# Patient Record
Sex: FEMALE | Race: WHITE | URBAN - METROPOLITAN AREA
[De-identification: names, ages, dates, MRNs, and addresses within clinical notes are randomized per-mention and may not be internally consistent; named-entity substitution may affect disease eponyms.]

---

## 2018-04-03 ENCOUNTER — HOSPITAL ENCOUNTER (EMERGENCY)
Facility: CLINIC | Age: 58
Discharge: HOME | End: 2018-04-03
Attending: EMERGENCY MEDICINE | Admitting: EMERGENCY MEDICINE
Payer: OTHER MISCELLANEOUS

## 2018-04-03 ENCOUNTER — APPOINTMENT (OUTPATIENT)
Dept: GENERAL RADIOLOGY | Facility: CLINIC | Age: 58
End: 2018-04-03
Attending: EMERGENCY MEDICINE
Payer: OTHER MISCELLANEOUS

## 2018-04-03 VITALS
BODY MASS INDEX: 22.62 KG/M2 | DIASTOLIC BLOOD PRESSURE: 78 MMHG | RESPIRATION RATE: 16 BRPM | TEMPERATURE: 98.5 F | HEART RATE: 72 BPM | WEIGHT: 158 LBS | SYSTOLIC BLOOD PRESSURE: 112 MMHG | OXYGEN SATURATION: 100 % | HEIGHT: 70 IN

## 2018-04-03 DIAGNOSIS — S42.402A CLOSED FRACTURE OF LEFT ELBOW, INITIAL ENCOUNTER: ICD-10-CM

## 2018-04-03 DIAGNOSIS — S53.105A DISLOCATION OF LEFT ELBOW, INITIAL ENCOUNTER: ICD-10-CM

## 2018-04-03 PROCEDURE — 96372 THER/PROPH/DIAG INJ SC/IM: CPT

## 2018-04-03 PROCEDURE — 99285 EMERGENCY DEPT VISIT HI MDM: CPT | Mod: 25

## 2018-04-03 PROCEDURE — 25000125 ZZHC RX 250: Performed by: EMERGENCY MEDICINE

## 2018-04-03 PROCEDURE — 24620 CLTX MONTEGGIA FX DISLC ELBW: CPT | Mod: LT

## 2018-04-03 PROCEDURE — 25000128 H RX IP 250 OP 636: Performed by: EMERGENCY MEDICINE

## 2018-04-03 PROCEDURE — 40000986 XR ELBOW LT 2 VW: Mod: LT

## 2018-04-03 PROCEDURE — 25000132 ZZH RX MED GY IP 250 OP 250 PS 637: Performed by: EMERGENCY MEDICINE

## 2018-04-03 PROCEDURE — 73060 X-RAY EXAM OF HUMERUS: CPT | Mod: LT

## 2018-04-03 PROCEDURE — 24600 TX CLSD ELBOW DISLC W/O ANES: CPT | Mod: LT

## 2018-04-03 PROCEDURE — 73080 X-RAY EXAM OF ELBOW: CPT | Mod: LT

## 2018-04-03 RX ORDER — HYDROMORPHONE HYDROCHLORIDE 1 MG/ML
0.5 INJECTION, SOLUTION INTRAMUSCULAR; INTRAVENOUS; SUBCUTANEOUS
Status: DISCONTINUED | OUTPATIENT
Start: 2018-04-03 | End: 2018-04-03 | Stop reason: HOSPADM

## 2018-04-03 RX ORDER — HYDROCODONE BITARTRATE AND ACETAMINOPHEN 5; 325 MG/1; MG/1
1-2 TABLET ORAL EVERY 6 HOURS PRN
Qty: 15 TABLET | Refills: 0 | Status: SHIPPED | OUTPATIENT
Start: 2018-04-03

## 2018-04-03 RX ORDER — IBUPROFEN 600 MG/1
600 TABLET, FILM COATED ORAL ONCE
Status: COMPLETED | OUTPATIENT
Start: 2018-04-03 | End: 2018-04-03

## 2018-04-03 RX ORDER — ONDANSETRON 4 MG/1
4 TABLET, ORALLY DISINTEGRATING ORAL ONCE
Status: COMPLETED | OUTPATIENT
Start: 2018-04-03 | End: 2018-04-03

## 2018-04-03 RX ORDER — HYDROCODONE BITARTRATE AND ACETAMINOPHEN 5; 325 MG/1; MG/1
1 TABLET ORAL ONCE
Status: COMPLETED | OUTPATIENT
Start: 2018-04-03 | End: 2018-04-03

## 2018-04-03 RX ORDER — ONDANSETRON 4 MG/1
4 TABLET, ORALLY DISINTEGRATING ORAL EVERY 8 HOURS PRN
Qty: 10 TABLET | Refills: 0 | Status: SHIPPED | OUTPATIENT
Start: 2018-04-03 | End: 2018-04-06

## 2018-04-03 RX ADMIN — ONDANSETRON 4 MG: 4 TABLET, ORALLY DISINTEGRATING ORAL at 11:44

## 2018-04-03 RX ADMIN — MIDAZOLAM HYDROCHLORIDE 5 MG: 1 INJECTION, SOLUTION INTRAMUSCULAR; INTRAVENOUS at 09:39

## 2018-04-03 RX ADMIN — IBUPROFEN 600 MG: 600 TABLET ORAL at 08:21

## 2018-04-03 RX ADMIN — HYDROMORPHONE HYDROCHLORIDE 0.5 MG: 1 INJECTION, SOLUTION INTRAMUSCULAR; INTRAVENOUS; SUBCUTANEOUS at 09:39

## 2018-04-03 RX ADMIN — HYDROCODONE BITARTRATE AND ACETAMINOPHEN 1 TABLET: 5; 325 TABLET ORAL at 08:21

## 2018-04-03 NOTE — ED AVS SNAPSHOT
Emergency Department    [address] [place] [id number]    Phone:  [phone]    Fax:  [phone]                                       Inga Anderson   MRN: [id number]    Department:   Emergency Department   Date of Visit:  4/3/2018           After Visit Summary Signature Page     I have received my discharge instructions, and my questions have been answered. I have discussed any challenges I see with this plan with the nurse or doctor.    ..........................................................................................................................................  Patient/Patient Representative Signature      ..........................................................................................................................................  Patient Representative Print Name and Relationship to Patient    ..................................................               ................................................  Date                                            Time    ..........................................................................................................................................  Reviewed by Signature/Title    ...................................................              ..............................................  Date                                                            Time

## 2018-04-03 NOTE — DISCHARGE INSTRUCTIONS
Elbow Dislocation  An elbow dislocation may occur after a fall onto an outstretched arm or in a car accident. When the hand hits a hard surface, the force is sent to the elbow. This tears ligaments and forces the bones out of the joint. Usually no bones are broken. But the nearby nerves and blood vessels can be damaged.     Once the joint is put back in place, it will take about 6 weeks for the ligaments to heal. For simple dislocations, you will use a splint or sling for the first few weeks. You will need range-of-motion exercises or physical therapy early in your recovery. This will prevent the elbow joint from getting stiff. Later, you may need strengthening exercises.  In more severe cases, you may need surgery to realign the joint and repair the torn ligaments or broken bones. Most elbow dislocations heal fully. But there is some risk for arthritis or loss of full range of motion in that joint.  Home care  Follow these guidelines when caring for yourself at home:    Keep your arm elevated to reduce pain and swelling. When sitting or lying down keep your arm above the level of your heart. You can do this by placing your arm on a pillow that rests on your chest or on a pillow at your side. This is most important during the first 2 days (48 hours) after the injury.    Put an ice pack on the injured area. Do this for 20 minutes every 1 to 2 hours the first day. You can make an ice pack by wrapping a plastic bag of ice cubes in a thin towel. As the ice melts, be careful that the cast or splint doesn t get wet. You can put the ice pack inside the sling and directly over the splint or cast. Continue using the ice pack 3 to 4 times a day for the next 2 days. Then use the ice pack as needed to ease pain and swelling.    Keep the splint or cast completely dry at all times. Bathe with your splint or cast out of the water. Protect it with a large plastic bag, rubber-banded at the top end. If a fiberglass splint or cast  gets wet, you can dry it with a hair dryer on the cool setting.    You may use acetaminophen or ibuprofen to control pain, unless another pain medicine was prescribed. If you have chronic liver or kidney disease, talk with your healthcare provider before using these medicines. Also talk with your provider if you ve had a stomach ulcer or gastrointestinal bleeding.    Don t take part in sports or physical education until your healthcare provider says it s OK to do so.  Follow-up care  Follow up with your healthcare provider in 1 week, or as advised. Ask your provider when to start range-of-motion exercises. These will keep the elbow from getting stiff.  If X-rays were taken, a radiologist may look at them. You will be told of any new findings that may affect your care.  When to seek medical advice  Call your healthcare provider right away if any of these occur:    The plaster splint or cast becomes wet or soft    The fiberglass splint or cast stays wet for more than 24 hours    Tightness or pain in the elbow gets worse    Fingers become swollen, cold, blue, numb, or tingly    You can t move your elbow as much as you could  Date Last Reviewed: 5/1/2017 2000-2017 [employer]. [address] [place] [address]. All rights reserved. This information is not intended as a substitute for professional medical care. Always follow your healthcare professional's instructions.          Elbow Fracture    You have a break (fracture) of one or more bones of your elbow joint. This may be a small crack in the bone. Or it may be a major break, with the broken parts pushed out of position.  This fracture usually takes 4 to 12 weeks to heal, depending on the type. The first step in treatment is with a splint or cast. Severe fractures may need surgery to put the bone fragments back into place.  Home care  Follow these guidelines when caring for yourself at home:    Keep your arm elevated to reduce pain and  swelling. When sitting or lying down keep your arm above the level of your heart. You can do this by placing your arm on a pillow that rests on your chest or on a pillow at your side. This is most important during the first 2 days (48 hours) after the injury.    Put an ice pack on the injured area. Do this for 20 minutes every 1 to 2 hours the first day. You can make an ice pack by wrapping a plastic bag of ice cubes in a thin towel. As the ice melts, be careful that the cast or splint doesn t get wet. You can place the ice pack inside the sling and directly over the splint or cast. Continue to use the ice pack 3 to 4 times a day for the next 2 days. Then use the ice pack as needed to ease pain and swelling.    Keep the splint or cast completely dry at all times. Bathe with your splint or cast out of the water. Protect it with a large plastic bag, rubber-banded at the top end. If a fiberglass splint or cast gets wet, you can dry it with a hair dryer.    You may use acetaminophen or ibuprofen to control pain, unless another pain medicine was prescribed. If you have chronic liver or kidney disease, talk with your healthcare provider before using these medicines. Also talk with your provider if you ve had a stomach ulcer or gastrointestinal bleeding.    Don t put creams or objects under the cast if you have itching.  Follow-up care  Follow up with your healthcare provider in 1 week, or as advised. This is to make sure the bone is healing the way it should. If a splint was put on, it may be changed to a cast during your follow-up visit.  X-rays may be taken. You will be told of any new findings that may affect your care.  When to seek medical advice  Call your healthcare provider right away if any of these occur:    The cast or splint cracks    The plaster cast or splint becomes wet or soft    The fiberglass cast or splint stays wet for more than 24 hours    Tightness or pain under the cast or splint gets worse    Bad  odor from the cast or wound fluid stains the cast    Fingers become swollen, cold, blue, numb, or tingly    You can t move your fingers    Skin around cast becomes red    Fever of 100.4 F (38 C) or higher, or as directed by your healthcare provider   Date Last Reviewed: 2/6/2017 2000-2017 [employer]. [address] [place] [address]. All rights reserved. This information is not intended as a substitute for professional medical care. Always follow your healthcare professional's instructions.

## 2018-04-03 NOTE — ED PROVIDER NOTES
"  History     Chief Complaint:  Arm Pain     The history is provided by the patient.      Inga Anderson is a 58 year old female who presents with arm pain. The patient flew in from [place], [place] for work in the [place] this morning and after she got the plane she slipped on a patch of ice. She fell and landed on her left arm and since then she has had pain in her left arm/elbow region. She has not other medical concerns.  No numbness or tingling distally, unable to range her elbow, constant and non radiating pain to elbow.     Allergies:  Codeine      Medications:    Aspirin    Past Medical History:    The patient does not have any past pertinent medical history.     Past Surgical History:    Appendectomy  Cholecystectomy  GYN Surgery    Family History:    History reviewed. No pertinent family history.      Social History:  Presents alone   Tobacco use: Never  Alcohol use: No  [facility]: [facility] [place]    Marital Status:        Review of Systems   Musculoskeletal:        Left arm pain   All other systems reviewed and are negative.    Physical Exam     Patient Vitals for the past 24 hrs:   BP Temp Temp src Pulse Resp SpO2 Height Weight   04/03/18 1015 117/77 - - - - 97 % - -   04/03/18 1000 109/76 - - - - 92 % - -   04/03/18 0945 120/78 - - - - - - -   04/03/18 0930 120/87 - - - - 100 % - -   04/03/18 0915 126/78 - - - - 100 % - -   04/03/18 0807 107/80 98.5  F (36.9  C) Oral 72 16 100 % 1.768 m (5' 9.6\") 71.7 kg (158 lb)      Physical Exam  Vitals: reviewed by me  General: Pt seen on [facility], cooperative, and alert to conversation  Eyes: Tracking well, clear conjunctiva BL  ENT: MMM, midline trachea. No e/o trauma to head or neck.   Lymph: No lymphadenopathy or masses noted  Lungs:   No tachypnea, no accessory muscle use. No respiratory distress.   CV: Rate as above, regular rhythm.    MSK: no peripheral edema or joint effusion.   Left upper extremity with sensation " intact light touch and 5 out of 5  strength.  Distal extremities warm and well-perfused, soft compartments.  Does have deformity over her olecranon process and what looks like a posterior dislocation.  Unable to range elbow without pain.  No skin tenting, no skin breaks.  No swelling.  No pain to palpation of the humerus.  Skin: No rash, normal turgor and temperature  Neuro: Clear speech and no facial droop.  Psych: Not RIS, no e/o AH/VH      Emergency Department Course   Imaging:  Radiographic findings were communicated with the patient who voiced understanding of the findings.  Elbow XR, G/E 3 Views, Left  IMPRESSION: There is posterior dislocation of the radius and ulna at the elbow joint. A bone fragment at the anterior aspect of the elbow joint is likely the coronoid process of the proximal ulna.    BRANDY HEAD MD    Humerus XR, G/E 2 views, left  IMPRESSION: There is posterior dislocation of the radius and ulna at the elbow joint. A bone fragment at the anterior aspect of the elbow joint is likely the coronoid process of the proximal ulna.    BRANDY HEAD MD     XR Elbow Left 2 Views  IMPRESSION: Elbow dislocation has been reduced. There appears to be a bone fragment within the joint space best seen on the lateral view. Fracture fragment also noted anterior to the distal humerus on the lateral view, likely a portion of the proximal ulna.    EMILY CLIFFORD MD    Procedures:  Procedure Note: Reduction of elbow joint dislocation  Physician: Flip Jiménez MD  Diagnosis: Posterior elbow dislocation   Consent: Informed written, see paper chart  Description of Procedure: Consent as above.  Patient positioned in supine position. The arm was grasped with the elbow maintained at 90 degrees.  Gentle longitudinal traction (+ RN holding countertraction) was applied and the elbow was reduced back into the glenoid fossa.  The neurovascular exam was normal before and after reduction  attempt.  The patient tolerated the procedure well, there were no complications.      Procedure Note: Splint Placement  Physician: Flip Jiménez MD  Diagnosis: Posterior elbow dislocation   Consent: Informed written, see paper chart  Description of Procedure:  Following reduction of elbow and placement of webril for comfort and padding, orthoglass sugartong splint was applied in standard fashion. It was secured with ace-wraps.   The elbow was maintained at 90 degrees flexion.   The neurovascular exam was normal before and after splint placement.  The patient tolerated the procedure well, there were no complications. A sling was applied.        Interventions:  0821: Norco 1 tablet PO  0821: Ibuprofen 600 mg PO  0939: Versed 5 mg IM  0939: Dilaudid 0.5mg IM injection     The patient's symptoms were improved with parenteral narcotics.    Emergency Department Course:  Past medical records, nursing notes, and vitals reviewed.  0815: I performed an exam of the patient and obtained history, as documented above.   0917: I rechecked the patient. Explained findings to patient. Patient consents to reduction.  0952: I attempted a posterior elbow dislocation reduction. Initial attempt was unsuccessful.  1001: I rechecked the patient and attempted to reduce her elbow. Reduction attempt successful, refer to procedure note above.  1052: I discussed the patient with Sandhya Welch PA-C of Orthopedic Surgery.    1102: I rechecked the patient. Findings and plan explained to the Patient. Patient discharged home with instructions regarding supportive care, medications, and reasons to return. The importance of close follow-up was reviewed.    Impression & Plan    Medical Decision Making:  Inga Anderson is a 58 year old female who presents to the emergency room after a mechanical fall and appears to have a left elbow dislocation. Status post reduction, see formal note above, as well as small fracture  fragment likely from the ulna. Patient's distal extremities are neurovascularly intact. No skin break, evidence of open fracture or skin tenting. Dislocation was reduced successfully and patient's pain is controlled. She is neurovascularly intact after splint placement and was placed in a sling. Repeat xray did show a small bony fragment in the joint and as such we did consult orthopedics here, who states that she does appear to be stable for outpatient orthopedic evaluation but this is a higher risk injury since the fragment is in the joint. I explained this to the patient and she will not follow here at [place] as she lives in [place], [place]. I see no indication to do CT scan, though it would likely be helpful for preoperative planning. Together with the patient we agreed upon a plan that she would follow with her [facility] orthopedist and follow their recommendations, understands that this needs to be dealt with within a week. Patient is okay with this plan, will give oral pain control as well as Zofran per patient's request. Patient will discharge as above.      Diagnosis:    ICD-10-CM    1. Dislocation of left elbow, initial encounter S53.105A    2. Closed fracture of left elbow, initial encounter S42.402A        Disposition:  Discharged to home with plan as outlined.    Discharge Medications:  New Prescriptions    HYDROCODONE-ACETAMINOPHEN (NORCO) 5-325 MG PER TABLET    Take 1-2 tablets by mouth every 6 hours as needed for moderate to severe pain    ONDANSETRON (ZOFRAN ODT) 4 MG ODT TAB    Take 1 tablet (4 mg) by mouth every 8 hours as needed for nausea         Alon Roca  4/3/2018    EMERGENCY DEPARTMENT  I, Alon Roca, am serving as a scribe at 8:15 AM on 4/3/2018 to document services personally performed by Flip Jiménez MD based on my observations and the provider's statements to me.       Flip Jiménez MD  04/03/18 1304

## 2018-04-03 NOTE — PROGRESS NOTES
Discussed patient and post reduction films with Dr. Jiménez in ED.   Patient needs CT scan of the elbow and follow-up this week with Dr. Agusto Melendez.  Elbow will be splinted at 90 degrees with posterior splint.    Sandhya Welch PA-C  [phone]

## 2018-04-03 NOTE — ED AVS SNAPSHOT
Emergency Department    [address] [facility] [address]    Phone:  [phone]    Fax:  [phone]                                       Inga Anderson   MRN: [id number]    Department:   Emergency Department   Date of Visit:  4/3/2018           Patient Information     Date Of Birth          1960        Your diagnoses for this visit were:     Dislocation of left elbow, initial encounter     Closed fracture of left elbow, initial encounter        You were seen by Flip Jiménez MD.      Follow-up Information     [facility]. Go in 1 week.    Why:  For repeat evaluation and symptom check.  Specifically, you need to meet with an ORTHOPAEDIC surgeon in the next week for possible surgery as you have a juan fragment in your joint space.     Contact information:    [address] [place] [address] [id number] [phone]          Follow up with  Emergency Department.    Specialty:  EMERGENCY MEDICINE    Why:  If symptoms worsen    Contact information:    [address] [facility] [address] [phone]        Discharge Instructions         Elbow Dislocation  An elbow dislocation may occur after a fall onto an outstretched arm or in a car accident. When the hand hits a hard surface, the force is sent to the elbow. This tears ligaments and forces the bones out of the joint. Usually no bones are broken. But the nearby nerves and blood vessels can be damaged.     Once the joint is put back in place, it will take about 6 weeks for the ligaments to heal. For simple dislocations, you will use a splint or sling for the first few weeks. You will need range-of-motion exercises or physical therapy early in your recovery. This will prevent the elbow joint from getting stiff. Later, you may need strengthening exercises.  In more severe cases, you may need surgery to realign the joint and repair the torn ligaments or broken  bones. Most elbow dislocations heal fully. But there is some risk for arthritis or loss of full range of motion in that joint.  Home care  Follow these guidelines when caring for yourself at home:    Keep your arm elevated to reduce pain and swelling. When sitting or lying down keep your arm above the level of your heart. You can do this by placing your arm on a pillow that rests on your chest or on a pillow at your side. This is most important during the first 2 days (48 hours) after the injury.    Put an ice pack on the injured area. Do this for 20 minutes every 1 to 2 hours the first day. You can make an ice pack by wrapping a plastic bag of ice cubes in a thin towel. As the ice melts, be careful that the cast or splint doesn t get wet. You can put the ice pack inside the sling and directly over the splint or cast. Continue using the ice pack 3 to 4 times a day for the next 2 days. Then use the ice pack as needed to ease pain and swelling.    Keep the splint or cast completely dry at all times. Bathe with your splint or cast out of the water. Protect it with a large plastic bag, rubber-banded at the top end. If a fiberglass splint or cast gets wet, you can dry it with a hair dryer on the cool setting.    You may use acetaminophen or ibuprofen to control pain, unless another pain medicine was prescribed. If you have chronic liver or kidney disease, talk with your healthcare provider before using these medicines. Also talk with your provider if you ve had a stomach ulcer or gastrointestinal bleeding.    Don t take part in sports or physical education until your healthcare provider says it s OK to do so.  Follow-up care  Follow up with your healthcare provider in 1 week, or as advised. Ask your provider when to start range-of-motion exercises. These will keep the elbow from getting stiff.  If X-rays were taken, a radiologist may look at them. You will be told of any new findings that may affect your care.  When to  seek medical advice  Call your healthcare provider right away if any of these occur:    The plaster splint or cast becomes wet or soft    The fiberglass splint or cast stays wet for more than 24 hours    Tightness or pain in the elbow gets worse    Fingers become swollen, cold, blue, numb, or tingly    You can t move your elbow as much as you could  Date Last Reviewed: 5/1/2017 2000-2017 [employer]. [address] [place] [address], [place] [address]. All rights reserved. This information is not intended as a substitute for professional medical care. Always follow your healthcare professional's instructions.          Elbow Fracture    You have a break (fracture) of one or more bones of your elbow joint. This may be a small crack in the bone. Or it may be a major break, with the broken parts pushed out of position.  This fracture usually takes 4 to 12 weeks to heal, depending on the type. The first step in treatment is with a splint or cast. Severe fractures may need surgery to put the bone fragments back into place.  Home care  Follow these guidelines when caring for yourself at home:    Keep your arm elevated to reduce pain and swelling. When sitting or lying down keep your arm above the level of your heart. You can do this by placing your arm on a pillow that rests on your chest or on a pillow at your side. This is most important during the first 2 days (48 hours) after the injury.    Put an ice pack on the injured area. Do this for 20 minutes every 1 to 2 hours the first day. You can make an ice pack by wrapping a plastic bag of ice cubes in a thin towel. As the ice melts, be careful that the cast or splint doesn t get wet. You can place the ice pack inside the sling and directly over the splint or cast. Continue to use the ice pack 3 to 4 times a day for the next 2 days. Then use the ice pack as needed to ease pain and swelling.    Keep the splint or cast completely dry at all times. Bathe with your  splint or cast out of the water. Protect it with a large plastic bag, rubber-banded at the top end. If a fiberglass splint or cast gets wet, you can dry it with a hair dryer.    You may use acetaminophen or ibuprofen to control pain, unless another pain medicine was prescribed. If you have chronic liver or kidney disease, talk with your healthcare provider before using these medicines. Also talk with your provider if you ve had a stomach ulcer or gastrointestinal bleeding.    Don t put creams or objects under the cast if you have itching.  Follow-up care  Follow up with your healthcare provider in 1 week, or as advised. This is to make sure the bone is healing the way it should. If a splint was put on, it may be changed to a cast during your follow-up visit.  X-rays may be taken. You will be told of any new findings that may affect your care.  When to seek medical advice  Call your healthcare provider right away if any of these occur:    The cast or splint cracks    The plaster cast or splint becomes wet or soft    The fiberglass cast or splint stays wet for more than 24 hours    Tightness or pain under the cast or splint gets worse    Bad odor from the cast or wound fluid stains the cast    Fingers become swollen, cold, blue, numb, or tingly    You can t move your fingers    Skin around cast becomes red    Fever of 100.4 F (38 C) or higher, or as directed by your healthcare provider   Date Last Reviewed: 2/6/2017 2000-2017 [employer]. [address] [place] [address]. All rights reserved. This information is not intended as a substitute for professional medical care. Always follow your healthcare professional's instructions.          24 Hour Appointment Hotline       To make an appointment at any [facility], call [phone] ([phone]). If you don't have a family doctor or clinic, we will help you find one. [facility] are conveniently located to serve the needs of you  and your family.             Review of your medicines      START taking        Dose / Directions Last dose taken    HYDROcodone-acetaminophen 5-325 MG per tablet   Commonly known as:  NORCO   Dose:  1-2 tablet   Quantity:  15 tablet        Take 1-2 tablets by mouth every 6 hours as needed for moderate to severe pain   Refills:  0        ondansetron 4 MG ODT tab   Commonly known as:  ZOFRAN ODT   Dose:  4 mg   Quantity:  10 tablet        Take 1 tablet (4 mg) by mouth every 8 hours as needed for nausea   Refills:  0          Our records show that you are taking the medicines listed below. If these are incorrect, please call your family doctor or clinic.        Dose / Directions Last dose taken    aspirin 81 MG tablet        Take by mouth daily   Refills:  0        FISH OIL PO   Dose:  1 capsule        Take 1 capsule by mouth daily   Refills:  0        guaiFENesin 600 MG 12 hr tablet   Commonly known as:  MUCINEX   Dose:  1200 mg        Take 1,200 mg by mouth 2 times daily as needed for congestion   Refills:  0        ibuprofen 200 MG tablet   Commonly known as:  ADVIL/MOTRIN   Dose:  400 mg        Take 400 mg by mouth every 4 hours as needed for mild pain   Refills:  0        multivitamin, therapeutic with minerals Tabs tablet   Dose:  1 tablet        Take 1 tablet by mouth daily   Refills:  0        NYQUIL MULTI-SYMPTOM OR        Take by mouth nightly as needed   Refills:  0        UNKNOWN TO PATIENT   Dose:  0.5 tablet        Take 0.5 tablets by mouth daily blood pressure pill - doesn't now the name   Refills:  0        VITAMIN D PO   Dose:  1 tablet        Take 1 tablet by mouth daily   Refills:  0                Prescriptions were sent or printed at these locations (2 Prescriptions)                   Other Prescriptions                Printed at Department/Unit printer (2 of 2)         HYDROcodone-acetaminophen (NORCO) 5-325 MG per tablet               ondansetron (ZOFRAN ODT) 4 MG ODT tab                 Procedures and tests performed during your visit     Elbow  XR, G/E 3 views, left    Humerus XR,  G/E 2 views, left    XR Elbow Left 2 Views      Orders Needing Specimen Collection     None      Pending Results     No orders found from 4/1/2018 to 4/4/2018.            Pending Culture Results     No orders found from 4/1/2018 to 4/4/2018.            Pending Results Instructions     If you had any lab results that were not finalized at the time of your Discharge, you can call the ED Lab Result RN at [phone]. You will be contacted by this team for any positive Lab results or changes in treatment. The nurses are available 7 days a week from 10A to 6:30P.  You can leave a message 24 hours per day and they will return your call.        Test Results From Your Hospital Stay        4/3/2018 10:43 AM      Narrative     HUMERUS LEFT TWO OR MORE VIEWS, ELBOW LEFT THREE OR MORE VIEWS April  3, 2018 9:04 AM      HISTORY: Fall with pain to humerus and elbow.      COMPARISON: None.        Impression     IMPRESSION: There is posterior dislocation of the radius and ulna at  the elbow joint. A bone fragment at the anterior aspect of the elbow  joint is likely the coronoid process of the proximal ulna.    BRANDY HEAD MD         4/3/2018 10:43 AM      Narrative     HUMERUS LEFT TWO OR MORE VIEWS, ELBOW LEFT THREE OR MORE VIEWS April  3, 2018 9:04 AM      HISTORY: Fall with pain to humerus and elbow.      COMPARISON: None.        Impression     IMPRESSION: There is posterior dislocation of the radius and ulna at  the elbow joint. A bone fragment at the anterior aspect of the elbow  joint is likely the coronoid process of the proximal ulna.    BRANDY HEAD MD                     4/3/2018 10:52 AM      Narrative     LEFT ELBOW TWO VIEWS  4/3/2018 10:32 AM     HISTORY: Post reduction.    COMPARISON: Prereduction films        Impression     IMPRESSION: Elbow dislocation has been reduced. There appears to be a  bone fragment  within the joint space best seen on the lateral view.  Fracture fragment also noted anterior to the distal humerus on the  lateral view, likely a portion of the proximal ulna.    EMILY CLIFFORD MD                Clinical Quality Measure: Blood Pressure Screening     Your blood pressure was checked while you were in the emergency department today. The last reading we obtained was  BP: 112/78 . Please read the guidelines below about what these numbers mean and what you should do about them.  If your systolic blood pressure (the top number) is less than 120 and your diastolic blood pressure (the bottom number) is less than 80, then your blood pressure is normal. There is nothing more that you need to do about it.  If your systolic blood pressure (the top number) is 120-139 or your diastolic blood pressure (the bottom number) is 80-89, your blood pressure may be higher than it should be. You should have your blood pressure rechecked within a year by a primary care provider.  If your systolic blood pressure (the top number) is 140 or greater or your diastolic blood pressure (the bottom number) is 90 or greater, you may have high blood pressure. High blood pressure is treatable, but if left untreated over time it can put you at risk for heart attack, stroke, or kidney failure. You should have your blood pressure rechecked by a primary care provider within the next 4 weeks.  If your provider in the emergency department today gave you specific instructions to follow-up with your doctor or provider even sooner than that, you should follow that instruction and not wait for up to 4 weeks for your follow-up visit.        Thank you for choosing [facility]       Thank you for choosing [facility] for your care. Our goal is always to provide you with excellent care. Hearing back from our patients is one way we can continue to improve our services. Please take a few minutes to complete the written survey that you may receive in the  "mail after you visit with us. Thank you!        [employer] Information     [employer] lets you send messages to your doctor, view your test results, renew your prescriptions, schedule appointments and more. To sign up, go to [email or web address] . Click on \"Log in\" on the left side of the screen, which will take you to the Welcome page. Then click on \"Sign up Now\" on the right side of the page.     You will be asked to enter the access code listed below, as well as some personal information. Please follow the directions to create your username and password.     Your access code is: [email or web address]  Expires: 2018 10:04 AM     Your access code will  in 90 days. If you need help or a new code, please call your [place] clinic or [phone].        Care EveryWhere ID     This is your Care EveryWhere ID. This could be used by other organizations to access your [facility] medical records  [id number]        Equal Access to Services     GEOVANY DE LA ROSA : Hadii aad ku hadasho Sonayanaali, waaxda luqadaha, qaybta kaalmada adeegyadot, jose a patel . So [facility] [phone].    ATENCIÓN: Si habla español, tiene a dejesus disposición servicios gratuitos de asistencia lingüística. Llame al [phone].    We comply with applicable federal civil rights laws and [place] laws. We do not discriminate on the basis of race, color, national origin, age, disability, sex, sexual orientation, or gender identity.            After Visit Summary       This is your record. Keep this with you and show to your community pharmacist(s) and doctor(s) at your next visit.                  "